# Patient Record
Sex: MALE | Race: WHITE | Employment: OTHER | URBAN - METROPOLITAN AREA
[De-identification: names, ages, dates, MRNs, and addresses within clinical notes are randomized per-mention and may not be internally consistent; named-entity substitution may affect disease eponyms.]

---

## 2021-02-15 ENCOUNTER — HOSPITAL ENCOUNTER (OUTPATIENT)
Age: 71
Discharge: HOME | End: 2021-02-15
Payer: MEDICARE

## 2021-02-15 LAB
ALBUMIN SERPL-MCNC: 4.9 G/DL (ref 3.5–5.2)
ALP BLD-CCNC: 82 U/L (ref 40–129)
ALT SERPL-CCNC: 13 U/L (ref 0–40)
ANION GAP SERPL CALCULATED.3IONS-SCNC: 8 MMOL/L (ref 7–16)
AST SERPL-CCNC: 19 U/L (ref 0–39)
BASOPHILS ABSOLUTE: 0.02 E9/L (ref 0–0.2)
BASOPHILS RELATIVE PERCENT: 0.3 % (ref 0–2)
BILIRUB SERPL-MCNC: 0.6 MG/DL (ref 0–1.2)
BUN BLDV-MCNC: 21 MG/DL (ref 8–23)
CALCIUM SERPL-MCNC: 9.8 MG/DL (ref 8.6–10.2)
CEA: 2.5 NG/ML (ref 0–5.2)
CHLORIDE BLD-SCNC: 101 MMOL/L (ref 98–107)
CHOLESTEROL, TOTAL: 168 MG/DL (ref 0–199)
CO2: 30 MMOL/L (ref 22–29)
CREAT SERPL-MCNC: 0.9 MG/DL (ref 0.7–1.2)
EOSINOPHILS ABSOLUTE: 0.19 E9/L (ref 0.05–0.5)
EOSINOPHILS RELATIVE PERCENT: 3.2 % (ref 0–6)
GFR AFRICAN AMERICAN: >60
GFR NON-AFRICAN AMERICAN: >60 ML/MIN/1.73
GLUCOSE BLD-MCNC: 93 MG/DL (ref 74–99)
HCT VFR BLD CALC: 45.7 % (ref 37–54)
HDLC SERPL-MCNC: 53 MG/DL
HEMOGLOBIN: 15 G/DL (ref 12.5–16.5)
IMMATURE GRANULOCYTES #: 0.01 E9/L
IMMATURE GRANULOCYTES %: 0.2 % (ref 0–5)
LDL CHOLESTEROL CALCULATED: 89 MG/DL (ref 0–99)
LYMPHOCYTES ABSOLUTE: 1.64 E9/L (ref 1.5–4)
LYMPHOCYTES RELATIVE PERCENT: 28 % (ref 20–42)
MCH RBC QN AUTO: 29 PG (ref 26–35)
MCHC RBC AUTO-ENTMCNC: 32.8 % (ref 32–34.5)
MCV RBC AUTO: 88.4 FL (ref 80–99.9)
MONOCYTES ABSOLUTE: 0.45 E9/L (ref 0.1–0.95)
MONOCYTES RELATIVE PERCENT: 7.7 % (ref 2–12)
NEUTROPHILS ABSOLUTE: 3.54 E9/L (ref 1.8–7.3)
NEUTROPHILS RELATIVE PERCENT: 60.6 % (ref 43–80)
PDW BLD-RTO: 12.7 FL (ref 11.5–15)
PLATELET # BLD: 278 E9/L (ref 130–450)
PMV BLD AUTO: 9.9 FL (ref 7–12)
POTASSIUM SERPL-SCNC: 4.8 MMOL/L (ref 3.5–5)
PROSTATE SPECIFIC ANTIGEN: 4.66 NG/ML (ref 0–4)
RBC # BLD: 5.17 E12/L (ref 3.8–5.8)
SODIUM BLD-SCNC: 139 MMOL/L (ref 132–146)
T4 TOTAL: 5.9 MCG/DL (ref 4.5–11.7)
TOTAL PROTEIN: 7.7 G/DL (ref 6.4–8.3)
TRIGL SERPL-MCNC: 130 MG/DL (ref 0–149)
TSH SERPL DL<=0.05 MIU/L-ACNC: 1.34 UIU/ML (ref 0.27–4.2)
VLDLC SERPL CALC-MCNC: 26 MG/DL
WBC # BLD: 5.9 E9/L (ref 4.5–11.5)

## 2021-02-15 PROCEDURE — 80061 LIPID PANEL: CPT

## 2021-02-15 PROCEDURE — 82378 CARCINOEMBRYONIC ANTIGEN: CPT

## 2021-02-15 PROCEDURE — 36415 COLL VENOUS BLD VENIPUNCTURE: CPT

## 2021-02-15 PROCEDURE — 85025 COMPLETE CBC W/AUTO DIFF WBC: CPT

## 2021-02-15 PROCEDURE — 84436 ASSAY OF TOTAL THYROXINE: CPT

## 2021-02-15 PROCEDURE — 84443 ASSAY THYROID STIM HORMONE: CPT

## 2021-02-15 PROCEDURE — G0103 PSA SCREENING: HCPCS

## 2021-02-15 PROCEDURE — 80053 COMPREHEN METABOLIC PANEL: CPT

## 2021-04-28 ENCOUNTER — HOSPITAL ENCOUNTER (OUTPATIENT)
Age: 71
Discharge: HOME | End: 2021-04-28
Payer: MEDICARE

## 2021-04-28 LAB — PROSTATE SPECIFIC ANTIGEN: 3.83 NG/ML (ref 0–4)

## 2021-04-28 PROCEDURE — 36415 COLL VENOUS BLD VENIPUNCTURE: CPT

## 2021-04-28 PROCEDURE — G0103 PSA SCREENING: HCPCS

## 2021-06-23 ENCOUNTER — HOSPITAL ENCOUNTER (OUTPATIENT)
Dept: GENERAL RADIOLOGY | Age: 71
Discharge: HOME | End: 2021-06-25
Payer: MEDICARE

## 2021-06-23 ENCOUNTER — HOSPITAL ENCOUNTER (OUTPATIENT)
Age: 71
Discharge: HOME | End: 2021-06-25
Payer: MEDICARE

## 2021-06-23 DIAGNOSIS — M19.011 ARTHRITIS OF RIGHT SHOULDER REGION: ICD-10-CM

## 2021-06-23 PROCEDURE — 73030 X-RAY EXAM OF SHOULDER: CPT

## 2022-01-13 ENCOUNTER — OFFICE VISIT (OUTPATIENT)
Dept: SURGERY | Age: 72
End: 2022-01-13
Payer: COMMERCIAL

## 2022-01-13 ENCOUNTER — TELEPHONE (OUTPATIENT)
Dept: SURGERY | Age: 72
End: 2022-01-13

## 2022-01-13 VITALS
HEART RATE: 80 BPM | DIASTOLIC BLOOD PRESSURE: 62 MMHG | TEMPERATURE: 97 F | HEIGHT: 68 IN | WEIGHT: 166.2 LBS | SYSTOLIC BLOOD PRESSURE: 104 MMHG | BODY MASS INDEX: 25.19 KG/M2 | OXYGEN SATURATION: 96 %

## 2022-01-13 DIAGNOSIS — C44.629 SQUAMOUS CELL CARCINOMA OF LEFT UPPER EXTREMITY: Primary | ICD-10-CM

## 2022-01-13 PROCEDURE — 1123F ACP DISCUSS/DSCN MKR DOCD: CPT | Performed by: PLASTIC SURGERY

## 2022-01-13 PROCEDURE — G8419 CALC BMI OUT NRM PARAM NOF/U: HCPCS | Performed by: PLASTIC SURGERY

## 2022-01-13 PROCEDURE — 99203 OFFICE O/P NEW LOW 30 MIN: CPT | Performed by: PLASTIC SURGERY

## 2022-01-13 PROCEDURE — 3017F COLORECTAL CA SCREEN DOC REV: CPT | Performed by: PLASTIC SURGERY

## 2022-01-13 PROCEDURE — G8427 DOCREV CUR MEDS BY ELIG CLIN: HCPCS | Performed by: PLASTIC SURGERY

## 2022-01-13 PROCEDURE — 4040F PNEUMOC VAC/ADMIN/RCVD: CPT | Performed by: PLASTIC SURGERY

## 2022-01-13 PROCEDURE — 1036F TOBACCO NON-USER: CPT | Performed by: PLASTIC SURGERY

## 2022-01-13 PROCEDURE — G8484 FLU IMMUNIZE NO ADMIN: HCPCS | Performed by: PLASTIC SURGERY

## 2022-01-13 RX ORDER — M-VIT,TX,IRON,MINS/CALC/FOLIC 27MG-0.4MG
1 TABLET ORAL DAILY
COMMUNITY

## 2022-01-13 RX ORDER — CITALOPRAM 20 MG/1
20 TABLET ORAL DAILY
COMMUNITY

## 2022-01-13 RX ORDER — ROSUVASTATIN CALCIUM 10 MG/1
10 TABLET, COATED ORAL DAILY
COMMUNITY

## 2022-01-13 RX ORDER — ESOMEPRAZOLE MAGNESIUM 20 MG/1
20 FOR SUSPENSION ORAL DAILY
COMMUNITY

## 2022-01-13 NOTE — PROGRESS NOTES
Department of Plastic Surgery - Adult  Attending Consult Note      CHIEF COMPLAINT:   Squamous Cell Cancer of left forearm    History Obtained From:  patient    HISTORY OF PRESENT ILLNESS:                The patient is a 70 y.o. 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 The patient states that they first noticed the lesion in November. It has grown in size since they first noticed the lesion. The lesion has changed in color and has not had discharge or bleeding. The pt has had the lesion biopsied and excised previously. The patient states that his doctor is concerned for residual squamous cell carcinoma at the edges of the excision site. The patient states the lesion is not painfull. The pt denies any associated symptoms. Past Medical History:    Past Medical History:   Diagnosis Date    Cancer ([facility] [address]     Cataract     Heartburn     Rheumatoid arthritis ([facility]     Skin cancer     Substance abuse ([facility] [facility]     Tuberculosis      Past Surgical History:    Past Surgical History:   Procedure Laterality Date    TONSILLECTOMY  1965     Current Medications:       Current Outpatient Medications   Medication Sig Dispense Refill    citalopram (CELEXA) 20 MG tablet Take 20 mg by mouth daily      rosuvastatin (CRESTOR) 10 MG tablet Take 10 mg by mouth daily      Multiple Vitamins-Minerals (THERAPEUTIC MULTIVITAMIN-MINERALS) tablet Take 1 tablet by mouth daily      esomeprazole Magnesium (NEXIUM) 20 MG PACK Take 20 mg by mouth daily       No current facility-administered medications for this visit. Allergies:  Patient has no known allergies.     Social History:   Social History     Socioeconomic History    Marital status:      Spouse name: Not on file    Number of children: Not on file    Years of education: Not on file    Highest education level: Not on file   Occupational History    Not on file   Tobacco Use    Smoking status: Never Smoker    Smokeless tobacco: Never Used   Substance and Sexual Activity    Alcohol use: Not Currently    Drug use: Not Currently    Sexual activity: Not on file   Other Topics Concern    Not on file   Social History Narrative    Not on file     Social Determinants of Health     Financial Resource Strain:     Difficulty of Paying Living Expenses: Not on file   Food Insecurity:     Worried About Running Out of Food in the Last Year: Not on file    [place] of Food in the Last Year: Not on file   Transportation Needs:     Lack of Transportation (Medical): Not on file    Lack of Transportation (Non-Medical): Not on file   Physical Activity:     Days of Exercise per Week: Not on file    Minutes of Exercise per Session: Not on file   Stress:     Feeling of Stress : Not on file   Social Connections:     Frequency of Communication with Friends and Family: Not on file    Frequency of Social Gatherings with Friends and Family: Not on file    Attends Adventism Services: Not on file    Active Member of Clubs or Organizations: Not on file    Attends Club or Organization Meetings: Not on file    Marital Status: Not on file   Intimate Partner Violence:     Fear of Current or Ex-Partner: Not on file    Emotionally Abused: Not on file    Physically Abused: Not on file    Sexually Abused: Not on file   Housing Stability:     Unable to Pay for Housing in the Last Year: Not on file    Number of [place] in the Last Year: Not on file    Unstable Housing in the Last Year: Not on file     Family History:   Family History   Problem Relation Age of Onset    Rheum Arthritis Mother     Cancer Mother     Depression Mother     Migraines Mother     Stroke Father        REVIEW OF SYSTEMS:    CONSTITUTIONAL:  negative for  fevers, chills, sweats and fatigue  EYES: negative for dipolpia or acute vision loss.    RESPIRATORY:  negative for  dry cough, cough with sputum, dyspnea, wheezing and chest pain  HENT:negative for pain, headache, difficulty swallowing or nose bleeds. CARDIOVASCULAR:  negative for  chest pain, dyspnea, palpitations, syncope  GASTROINTESTINAL:  negative for nausea, vomiting, change in bowel habits, diarrhea, constipation and abdominal pain  EXTREMITIES: negative for edema  MUSCULOSKELETAL: negative for muscle weakness  SKIN: positive for lesion, negative for itching or rashes. HEME: negative for easy brusing or bleeding  PSYCH: Alert and oriented to person place and time      PHYSICAL EXAM:      VITALS:  /62 (Site: Right Upper Arm, Position: Sitting, Cuff Size: Medium Adult)   Pulse 80   Temp 97 °F (36.1 °C) (Temporal)   Ht 5' 7.5\" (1.715 m)   Wt 166 lb 3.2 oz (75.4 kg)   SpO2 96%   BMI 25.65 kg/m²   CONSTITUTIONAL:  awake, alert, cooperative, no apparent distress, and appears stated age  EYES: PERRLA, EOMI, no signs of occular infection  LUNGS:  No increased work of breathing, good air exchange, clear to auscultation bilaterally, no crackles or wheezing  CARDIOVASCULAR:  Normal apical impulse, regular rate and rhythm  EXTREMITIES: no signs of clubbing or cyanosis. MUSCULOSKELETAL: negative for flaccid muscle tone or spastic movements. NEURO: Cranial nerves II-XII grossly intact. No signs of agitated mood. SKIN: Biopsy proven squamous cell carcinoma left forearm-  25mm x 20mm,  Skin tone in color, irregular border, raised, no signs of bleeding,drainage or infection. Non tender to palpation.      DATA:    Labs: CBC:   Lab Results   Component Value Date    WBC 5.9 02/15/2021    RBC 5.17 02/15/2021    HGB 15.0 02/15/2021    HCT 45.7 02/15/2021    MCV 88.4 02/15/2021    MCH 29.0 02/15/2021    MCHC 32.8 02/15/2021    RDW 12.7 02/15/2021     02/15/2021    MPV 9.9 02/15/2021     BMP:    Lab Results   Component Value Date     02/15/2021    K 4.8 02/15/2021     02/15/2021    CO2 30 02/15/2021    BUN 21 02/15/2021    LABALBU 4.9 02/15/2021    CREATININE 0.9 02/15/2021    CALCIUM 9.8 02/15/2021 GFRAA >60 02/15/2021    LABGLOM >60 02/15/2021    GLUCOSE 93 02/15/2021       Radiology Review:  No radiology needed at this time  Pathology Review:  Pathology reviewed           IMPRESSION/RECOMMENDATIONS:        Diagnosis  -) Squamous cell carcinoma of left forearm      -The patient was counseled on their pathology results and the need for surgical   intervention.   -We will plan to proceed and have the lesion formely excised with margins in the operating room. -The patient will  require frozen sections in the operating room  -The patient will  require pre-op clearance from their PCP. -The risks, benefits and options were discussed with the pt. The risks included but not limited to pain, bleeding, infection, heavy scarring, damage to surrounding structures, fluid collections, asymmetry, and need for further procedures. All of His questions were answered to their satisfaction and He agrees to proceed with the procedure. Photos obtained        This document is generated, in part, by voice recognition software and thus  syntax and grammatical errors are possible.     Carlos Cooley MD  2:07 PM  1/19/2022

## 2022-01-18 NOTE — PROGRESS NOTES
[address] [facility] PRE-ADMISSION TESTING GENERAL INSTRUCTIONS  [facility] Phone Number: [phone]    The Preadmission Testing patient is instructed accordingly using the following criteria (Check Applicable):    GENERAL INSTRUCTIONS:    [x] Antibacterial Soap Shower Morning of Surgery. [x] Do not wear lotions, powders, deodorant. [x] Nothing by mouth after midnight, including gum, candy, mints, or water. [x] You may brush your teeth, gargle, but do NOT swallow water. [x] No tobacco products, illegal drugs, or alcohol within 24 hours of your surgery. [x] Jewelry, valuables or body piercing's should not be brought to the hospital. All body and/or tongue piercing's must be removed prior to arriving to hospital. ALL hair pins must be removed. [x] Arrange transportation with a responsible adult  to and from the hospital. Arrange for someone to be with you for the remainder of the day and for 24 hours after your procedure due to having had anesthesia. Who will be your  for transportation? Cher Lauren- Wife        Who will be staying with you for 24 hrs after your procedure? Cher Lauren- Wife  [x] Bring insurance card and photo ID. [x] Bring copy of living will or healthcare power of  papers to be placed in your electronic record. PARKING INSTRUCTIONS:     [x] Arrival Time: 1/25/22 @ 0530. · [x] Parking lot '\"I\"  is located on [facility] (the corner of [facility]). To enter, press the button and the gate will lift. A free token will be provided to exit the lot. EDUCATION INSTRUCTIONS:        [] Pain: Post-op pain is normal and to be expected. You will be asked to rate your pain from 0-10. [] Ask your nurse for your pain medication. MEDICATION INSTRUCTIONS:    [x] Bring a complete list of your medications, please write the last time you took the medicine, give this list to the nurse.   [x] Take the following medications the morning of surgery with 1-2 ounces of water: Celexa  [x] Stop herbal supplements and vitamins 5 days before your surgery. [x] Follow physician instructions regarding any blood thinners you may be taking. WHAT TO EXPECT:    [x] The day of surgery you will be greeted and checked in by the [employer].  In addition, you will be registered in the [facility] by a Patient Access Representative. Please bring your photo ID and insurance card. A nurse will greet you in accordance to the time you are needed in the pre-op area to prepare you for surgery. Please do not be discouraged if you are not greeted in the order you arrive as there are many variables that are involved in patient preparation. Your patience is greatly appreciated as you wait for your nurse. Please bring in items such as: books, magazines, newspapers, electronics, or any other items  to occupy your time in the waiting area. [x]  Delays may occur with surgery and staff will make a sincere effort to keep you informed of delays. If any delays occur with your procedure, we apologize ahead of time for your inconvenience as we recognize the value of your time.

## 2022-01-19 ENCOUNTER — PREP FOR PROCEDURE (OUTPATIENT)
Dept: SURGERY | Age: 72
End: 2022-01-19

## 2022-01-19 RX ORDER — SODIUM CHLORIDE 0.9 % (FLUSH) 0.9 %
5-40 SYRINGE (ML) INJECTION EVERY 12 HOURS SCHEDULED
Status: CANCELLED | OUTPATIENT
Start: 2022-01-19

## 2022-01-19 RX ORDER — SODIUM CHLORIDE 0.9 % (FLUSH) 0.9 %
5-40 SYRINGE (ML) INJECTION PRN
Status: CANCELLED | OUTPATIENT
Start: 2022-01-19

## 2022-01-19 RX ORDER — SODIUM CHLORIDE 9 MG/ML
INJECTION, SOLUTION INTRAVENOUS CONTINUOUS
Status: CANCELLED | OUTPATIENT
Start: 2022-01-19

## 2022-01-19 RX ORDER — SODIUM CHLORIDE 9 MG/ML
25 INJECTION, SOLUTION INTRAVENOUS PRN
Status: CANCELLED | OUTPATIENT
Start: 2022-01-19

## 2022-01-19 NOTE — H&P
Department of Plastic Surgery - Adult  Attending Consult Note        CHIEF COMPLAINT:   Squamous Cell Cancer of left forearm     History Obtained From:  patient     HISTORY OF PRESENT ILLNESS:                 The patient is a 70 y.o. 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 The patient states that they first noticed the lesion in November. It has grown in size since they first noticed the lesion. The lesion has changed in color and has not had discharge or bleeding. The pt has had the lesion biopsied and excised previously. The patient states that his doctor is concerned for residual squamous cell carcinoma at the edges of the excision site. The patient states the lesion is not painfull.   The pt denies any associated symptoms.       Past Medical History:    Past Medical History        Past Medical History:   Diagnosis Date    Cancer ([facility] [address]      Cataract      Heartburn      Rheumatoid arthritis ([facility] [address] [facility]      Skin cancer      Substance abuse ([facility] [address]      Tuberculosis           Past Surgical History:    Past Surgical History         Past Surgical History:   Procedure Laterality Date    TONSILLECTOMY   1965         Current Medications:       Current Facility-Administered Medications          Current Outpatient Medications   Medication Sig Dispense Refill    citalopram (CELEXA) 20 MG tablet Take 20 mg by mouth daily        rosuvastatin (CRESTOR) 10 MG tablet Take 10 mg by mouth daily        Multiple Vitamins-Minerals (THERAPEUTIC MULTIVITAMIN-MINERALS) tablet Take 1 tablet by mouth daily        esomeprazole Magnesium (NEXIUM) 20 MG PACK Take 20 mg by mouth daily          No current facility-administered medications for this visit.          Allergies:  Patient has no known allergies.     Social History:   Social History               Socioeconomic History    Marital status:        Spouse name: Not on file    Number of children: Not on file    Years of education: Not on file    Highest education level: Not on file   Occupational History    Not on file   Tobacco Use    Smoking status: Never Smoker    Smokeless tobacco: Never Used   Substance and Sexual Activity    Alcohol use: Not Currently    Drug use: Not Currently    Sexual activity: Not on file   Other Topics Concern    Not on file   Social History Narrative    Not on file      Social Determinants of Health          Financial Resource Strain:     Difficulty of Paying Living Expenses: Not on file   Food Insecurity:     Worried About Running Out of Food in the Last Year: Not on file    [place] of Food in the Last Year: Not on file   Transportation Needs:     Lack of Transportation (Medical): Not on file    Lack of Transportation (Non-Medical):  Not on file   Physical Activity:     Days of Exercise per Week: Not on file    Minutes of Exercise per Session: Not on file   Stress:     Feeling of Stress : Not on file   Social Connections:     Frequency of Communication with Friends and Family: Not on file    Frequency of Social Gatherings with Friends and Family: Not on file    Attends Buddhist Services: Not on file    Active Member of [address] [place] [address] or Organizations: Not on file    Attends Club or Organization Meetings: Not on file    Marital Status: Not on file   Intimate Partner Violence:     Fear of Current or Ex-Partner: Not on file    Emotionally Abused: Not on file    Physically Abused: Not on file    Sexually Abused: Not on file   Housing Stability:     Unable to Pay for Housing in the Last Year: Not on file    Number of [place] in the Last Year: Not on file    Unstable Housing in the Last Year: Not on file         Family History:   Family History         Family History   Problem Relation Age of Onset    Rheum Arthritis Mother      Cancer Mother      Depression Mother      Migraines Mother      Stroke Father              REVIEW OF SYSTEMS:    CONSTITUTIONAL:  negative for  fevers, chills, sweats and fatigue  EYES: negative for dipolpia or acute vision loss. RESPIRATORY:  negative for  dry cough, cough with sputum, dyspnea, wheezing and chest pain  HENT:negative for pain, headache, difficulty swallowing or nose bleeds. CARDIOVASCULAR:  negative for  chest pain, dyspnea, palpitations, syncope  GASTROINTESTINAL:  negative for nausea, vomiting, change in bowel habits, diarrhea, constipation and abdominal pain  EXTREMITIES: negative for edema  MUSCULOSKELETAL: negative for muscle weakness  SKIN: positive for lesion, negative for itching or rashes. HEME: negative for easy brusing or bleeding  PSYCH: Alert and oriented to person place and time        PHYSICAL EXAM:       VITALS:  /62 (Site: Right Upper Arm, Position: Sitting, Cuff Size: Medium Adult)   Pulse 80   Temp 97 °F (36.1 °C) (Temporal)   Ht 5' 7.5\" (1.715 m)   Wt 166 lb 3.2 oz (75.4 kg)   SpO2 96%   BMI 25.65 kg/m²   CONSTITUTIONAL:  awake, alert, cooperative, no apparent distress, and appears stated age  EYES: PERRLA, EOMI, no signs of occular infection  LUNGS:  No increased work of breathing, good air exchange, clear to auscultation bilaterally, no crackles or wheezing  CARDIOVASCULAR:  Normal apical impulse, regular rate and rhythm  EXTREMITIES: no signs of clubbing or cyanosis. MUSCULOSKELETAL: negative for flaccid muscle tone or spastic movements. NEURO: Cranial nerves II-XII grossly intact. No signs of agitated mood.      SKIN: Biopsy proven squamous cell carcinoma left forearm-  25mm x 20mm,  Skin tone in color, irregular border, raised, no signs of bleeding,drainage or infection.  Non tender to palpation.      DATA:    Labs: CBC:         Lab Results   Component Value Date     WBC 5.9 02/15/2021     RBC 5.17 02/15/2021     HGB 15.0 02/15/2021     HCT 45.7 02/15/2021     MCV 88.4 02/15/2021     MCH 29.0 02/15/2021     MCHC 32.8 02/15/2021     RDW 12.7 02/15/2021      02/15/2021     MPV 9.9 02/15/2021      BMP: Lab Results   Component Value Date      02/15/2021     K 4.8 02/15/2021      02/15/2021     CO2 30 02/15/2021     BUN 21 02/15/2021     LABALBU 4.9 02/15/2021     CREATININE 0.9 02/15/2021     CALCIUM 9.8 02/15/2021     GFRAA >60 02/15/2021     LABGLOM >60 02/15/2021     GLUCOSE 93 02/15/2021         Radiology Review:  No radiology needed at this time  Pathology Review:  Pathology reviewed             IMPRESSION/RECOMMENDATIONS:          Diagnosis  -) Squamous cell carcinoma of left forearm        -The patient was counseled on their pathology results and the need for surgical   intervention.   -We will plan to proceed and have the lesion formely excised with margins in the operating room. -The patient will  require frozen sections in the operating room  -The patient will  require pre-op clearance from their PCP.          -The risks, benefits and options were discussed with the pt. The risks included but not limited to pain, bleeding, infection, heavy scarring, damage to surrounding structures, fluid collections, asymmetry, and need for further procedures. All of His questions were answered to their satisfaction and He agrees to proceed with the procedure.

## 2022-01-25 ENCOUNTER — HOSPITAL ENCOUNTER (OUTPATIENT)
Age: 72
Setting detail: OUTPATIENT SURGERY
Discharge: HOME | End: 2022-01-25
Attending: PLASTIC SURGERY | Admitting: PLASTIC SURGERY
Payer: COMMERCIAL

## 2022-01-25 ENCOUNTER — ANESTHESIA EVENT (OUTPATIENT)
Dept: OPERATING ROOM | Age: 72
End: 2022-01-25
Payer: COMMERCIAL

## 2022-01-25 ENCOUNTER — ANESTHESIA (OUTPATIENT)
Dept: OPERATING ROOM | Age: 72
End: 2022-01-25
Payer: COMMERCIAL

## 2022-01-25 VITALS
RESPIRATION RATE: 18 BRPM | TEMPERATURE: 97.7 F | OXYGEN SATURATION: 98 % | HEIGHT: 67 IN | HEART RATE: 66 BPM | WEIGHT: 160 LBS | DIASTOLIC BLOOD PRESSURE: 67 MMHG | BODY MASS INDEX: 25.11 KG/M2 | SYSTOLIC BLOOD PRESSURE: 125 MMHG

## 2022-01-25 VITALS — OXYGEN SATURATION: 99 % | SYSTOLIC BLOOD PRESSURE: 124 MMHG | DIASTOLIC BLOOD PRESSURE: 68 MMHG

## 2022-01-25 DIAGNOSIS — G89.18 POST-OP PAIN: Primary | ICD-10-CM

## 2022-01-25 PROCEDURE — 3700000001 HC ADD 15 MINUTES (ANESTHESIA): Performed by: PLASTIC SURGERY

## 2022-01-25 PROCEDURE — 3600000002 HC SURGERY LEVEL 2 BASE: Performed by: PLASTIC SURGERY

## 2022-01-25 PROCEDURE — 2500000003 HC RX 250 WO HCPCS: Performed by: PLASTIC SURGERY

## 2022-01-25 PROCEDURE — 2709999900 HC NON-CHARGEABLE SUPPLY: Performed by: PLASTIC SURGERY

## 2022-01-25 PROCEDURE — 6360000002 HC RX W HCPCS: Performed by: ANESTHESIOLOGIST ASSISTANT

## 2022-01-25 PROCEDURE — 99999 PR OFFICE/OUTPT VISIT,PROCEDURE ONLY: CPT | Performed by: PHYSICIAN ASSISTANT

## 2022-01-25 PROCEDURE — 3700000000 HC ANESTHESIA ATTENDED CARE: Performed by: PLASTIC SURGERY

## 2022-01-25 PROCEDURE — 6370000000 HC RX 637 (ALT 250 FOR IP): Performed by: PLASTIC SURGERY

## 2022-01-25 PROCEDURE — 88305 TISSUE EXAM BY PATHOLOGIST: CPT

## 2022-01-25 PROCEDURE — 3600000012 HC SURGERY LEVEL 2 ADDTL 15MIN: Performed by: PLASTIC SURGERY

## 2022-01-25 PROCEDURE — 15240 FTH/GFT F/C/C/M/N/AX/G/H/F20: CPT | Performed by: PLASTIC SURGERY

## 2022-01-25 PROCEDURE — 2580000003 HC RX 258: Performed by: PHYSICIAN ASSISTANT

## 2022-01-25 PROCEDURE — 7100000011 HC PHASE II RECOVERY - ADDTL 15 MIN: Performed by: PLASTIC SURGERY

## 2022-01-25 PROCEDURE — 6360000002 HC RX W HCPCS: Performed by: PHYSICIAN ASSISTANT

## 2022-01-25 PROCEDURE — 7100000010 HC PHASE II RECOVERY - FIRST 15 MIN: Performed by: PLASTIC SURGERY

## 2022-01-25 PROCEDURE — 11604 EXC TR-EXT MAL+MARG 3.1-4 CM: CPT | Performed by: PLASTIC SURGERY

## 2022-01-25 RX ORDER — FENTANYL CITRATE 50 UG/ML
25 INJECTION, SOLUTION INTRAMUSCULAR; INTRAVENOUS EVERY 5 MIN PRN
Status: DISCONTINUED | OUTPATIENT
Start: 2022-01-25 | End: 2022-01-25 | Stop reason: HOSPADM

## 2022-01-25 RX ORDER — SODIUM CHLORIDE 9 MG/ML
INJECTION, SOLUTION INTRAVENOUS CONTINUOUS
Status: DISCONTINUED | OUTPATIENT
Start: 2022-01-25 | End: 2022-01-25 | Stop reason: HOSPADM

## 2022-01-25 RX ORDER — PROPOFOL 10 MG/ML
INJECTION, EMULSION INTRAVENOUS PRN
Status: DISCONTINUED | OUTPATIENT
Start: 2022-01-25 | End: 2022-01-25 | Stop reason: SDUPTHER

## 2022-01-25 RX ORDER — MIDAZOLAM HYDROCHLORIDE 1 MG/ML
INJECTION INTRAMUSCULAR; INTRAVENOUS PRN
Status: DISCONTINUED | OUTPATIENT
Start: 2022-01-25 | End: 2022-01-25 | Stop reason: SDUPTHER

## 2022-01-25 RX ORDER — LIDOCAINE HYDROCHLORIDE AND EPINEPHRINE 10; 10 MG/ML; UG/ML
INJECTION, SOLUTION INFILTRATION; PERINEURAL PRN
Status: DISCONTINUED | OUTPATIENT
Start: 2022-01-25 | End: 2022-01-25 | Stop reason: ALTCHOICE

## 2022-01-25 RX ORDER — ONDANSETRON 2 MG/ML
4 INJECTION INTRAMUSCULAR; INTRAVENOUS
Status: DISCONTINUED | OUTPATIENT
Start: 2022-01-25 | End: 2022-01-25 | Stop reason: HOSPADM

## 2022-01-25 RX ORDER — DIAPER,BRIEF,INFANT-TODD,DISP
EACH MISCELLANEOUS PRN
Status: DISCONTINUED | OUTPATIENT
Start: 2022-01-25 | End: 2022-01-25 | Stop reason: ALTCHOICE

## 2022-01-25 RX ORDER — HYDROCODONE BITARTRATE AND ACETAMINOPHEN 5; 325 MG/1; MG/1
1 TABLET ORAL EVERY 6 HOURS PRN
Qty: 12 TABLET | Refills: 0 | Status: SHIPPED | OUTPATIENT
Start: 2022-01-25 | End: 2022-01-28

## 2022-01-25 RX ORDER — SODIUM CHLORIDE 9 MG/ML
25 INJECTION, SOLUTION INTRAVENOUS PRN
Status: DISCONTINUED | OUTPATIENT
Start: 2022-01-25 | End: 2022-01-25 | Stop reason: HOSPADM

## 2022-01-25 RX ORDER — CEPHALEXIN 500 MG/1
500 CAPSULE ORAL 3 TIMES DAILY
Qty: 21 CAPSULE | Refills: 0 | Status: SHIPPED | OUTPATIENT
Start: 2022-01-25 | End: 2022-02-01

## 2022-01-25 RX ORDER — DIAPER,BRIEF,INFANT-TODD,DISP
EACH MISCELLANEOUS
Qty: 30 G | Refills: 1 | Status: SHIPPED | OUTPATIENT
Start: 2022-01-25

## 2022-01-25 RX ORDER — HYDROCODONE BITARTRATE AND ACETAMINOPHEN 5; 325 MG/1; MG/1
1 TABLET ORAL PRN
Status: DISCONTINUED | OUTPATIENT
Start: 2022-01-25 | End: 2022-01-25 | Stop reason: HOSPADM

## 2022-01-25 RX ORDER — HYDROCODONE BITARTRATE AND ACETAMINOPHEN 5; 325 MG/1; MG/1
2 TABLET ORAL PRN
Status: DISCONTINUED | OUTPATIENT
Start: 2022-01-25 | End: 2022-01-25 | Stop reason: HOSPADM

## 2022-01-25 RX ORDER — SODIUM CHLORIDE 0.9 % (FLUSH) 0.9 %
5-40 SYRINGE (ML) INJECTION PRN
Status: DISCONTINUED | OUTPATIENT
Start: 2022-01-25 | End: 2022-01-25 | Stop reason: HOSPADM

## 2022-01-25 RX ORDER — SODIUM CHLORIDE 0.9 % (FLUSH) 0.9 %
5-40 SYRINGE (ML) INJECTION EVERY 12 HOURS SCHEDULED
Status: DISCONTINUED | OUTPATIENT
Start: 2022-01-25 | End: 2022-01-25 | Stop reason: HOSPADM

## 2022-01-25 RX ORDER — FENTANYL CITRATE 50 UG/ML
INJECTION, SOLUTION INTRAMUSCULAR; INTRAVENOUS PRN
Status: DISCONTINUED | OUTPATIENT
Start: 2022-01-25 | End: 2022-01-25 | Stop reason: SDUPTHER

## 2022-01-25 RX ADMIN — PROPOFOL 50 MCG/KG/MIN: 10 INJECTION, EMULSION INTRAVENOUS at 07:30

## 2022-01-25 RX ADMIN — FENTANYL CITRATE 25 MCG: 50 INJECTION, SOLUTION INTRAMUSCULAR; INTRAVENOUS at 07:43

## 2022-01-25 RX ADMIN — PROPOFOL 30 MG: 10 INJECTION, EMULSION INTRAVENOUS at 07:29

## 2022-01-25 RX ADMIN — SODIUM CHLORIDE: 9 INJECTION, SOLUTION INTRAVENOUS at 06:17

## 2022-01-25 RX ADMIN — FENTANYL CITRATE 25 MCG: 50 INJECTION, SOLUTION INTRAMUSCULAR; INTRAVENOUS at 07:29

## 2022-01-25 RX ADMIN — MIDAZOLAM 1 MG: 1 INJECTION INTRAMUSCULAR; INTRAVENOUS at 07:25

## 2022-01-25 RX ADMIN — Medication 2000 MG: at 07:30

## 2022-01-25 ASSESSMENT — PULMONARY FUNCTION TESTS
PIF_VALUE: 21
PIF_VALUE: 23
PIF_VALUE: 21
PIF_VALUE: 23
PIF_VALUE: 0
PIF_VALUE: 133
PIF_VALUE: 21
PIF_VALUE: 23
PIF_VALUE: 21
PIF_VALUE: 4
PIF_VALUE: 21
PIF_VALUE: 21
PIF_VALUE: 23
PIF_VALUE: 23
PIF_VALUE: 21
PIF_VALUE: 21
PIF_VALUE: 23
PIF_VALUE: 23
PIF_VALUE: 21
PIF_VALUE: 21
PIF_VALUE: 0
PIF_VALUE: 0
PIF_VALUE: 25
PIF_VALUE: 0
PIF_VALUE: 21
PIF_VALUE: 23
PIF_VALUE: 21
PIF_VALUE: 23
PIF_VALUE: 21
PIF_VALUE: 23
PIF_VALUE: 21
PIF_VALUE: 21
PIF_VALUE: 0
PIF_VALUE: 23
PIF_VALUE: 21
PIF_VALUE: 1
PIF_VALUE: 1
PIF_VALUE: 23

## 2022-01-25 ASSESSMENT — PAIN SCALES - GENERAL
PAINLEVEL_OUTOF10: 0

## 2022-01-25 ASSESSMENT — PAIN - FUNCTIONAL ASSESSMENT: PAIN_FUNCTIONAL_ASSESSMENT: 0-10

## 2022-01-25 NOTE — BRIEF OP NOTE
Brief Postoperative Note      Patient: Nia White  YOB: 1950  MRN: [id number]    Date of Procedure: 1/25/2022    Pre-Op Diagnosis: SQUAMOUS CELL CARCINOMA LEFT FOREARM    Post-Op Diagnosis: Same       Procedure(s):  EXCISION SQUAMOUS CELL CARCINOMA LEFT FOREARM FULL THICKNESS SKIN GRAFT FROM LEFT CHEST    Surgeon(s):  Portillo Rucker MD    Assistant:  Physician Assistant: JULIAN Posada  Resident: Jordy Vargas DO    Anesthesia: Monitor Anesthesia Care    Estimated Blood Loss (mL): 5cc    Fluids: 174UX    Complications: None    Specimens:   ID Type Source Tests Collected by Time Destination   A : LEFT FOREARM SQUAMOUS CELL CARCINOMA Tissue Tissue SURGICAL PATHOLOGY Portillo Rucker MD 1/25/2022 7660        Implants:  * No implants in log *      Drains: * No LDAs found *    Findings: Lesion: [id number], Margin 4mm, Defect [id number], FTSG [id number]    Electronically signed by Jordy Vargas DO on 1/25/2022 at 9:35 AM

## 2022-01-25 NOTE — ANESTHESIA PRE PROCEDURE
Department of Anesthesiology  Preprocedure Note       Name:  Teodoro Garcia   Age:  70 y.o.  :  1950                                          MRN:  [id number]         Date:  2022      Surgeon: Marcy Brian):  Adrián Sanchez MD    Procedure: Procedure(s):  EXCISION SQUAMOUS CELL CARCINOMA LEFT FOREARM    Medications prior to admission:   Prior to Admission medications    Medication Sig Start Date End Date Taking? Authorizing Provider   citalopram (CELEXA) 20 MG tablet Take 20 mg by mouth daily   Yes Historical Provider, MD   rosuvastatin (CRESTOR) 10 MG tablet Take 10 mg by mouth daily   Yes Historical Provider, MD   Multiple Vitamins-Minerals (THERAPEUTIC MULTIVITAMIN-MINERALS) tablet Take 1 tablet by mouth daily   Yes Historical Provider, MD   esomeprazole Magnesium (NEXIUM) 20 MG PACK Take 20 mg by mouth daily   Yes Historical Provider, MD       Current medications:    Current Facility-Administered Medications   Medication Dose Route Frequency Provider Last Rate Last Admin    0.9 % sodium chloride infusion  25 mL IntraVENous PRN JULIAN Johnson        0.9 % sodium chloride infusion   IntraVENous Continuous Myra [place] 125 mL/hr at 22 0711 NoRateChange at 22 0711    ceFAZolin (ANCEF) 2000 mg in sterile water 20 mL IV syringe  2,000 mg IntraVENous See Admin Instructions JULIAN Johnson        sodium chloride flush 0.9 % injection 5-40 mL  5-40 mL IntraVENous 2 times per day JULIAN Johnson        sodium chloride flush 0.9 % injection 5-40 mL  5-40 mL IntraVENous PRN JULIAN Johnson           Allergies:  No Known Allergies    Problem List:  There is no problem list on file for this patient.       Past Medical History:        Diagnosis Date    [facility] ([facility] [address]     Cataract     Heartburn     Rheumatoid arthritis ([facility] [facility]     Skin cancer     Substance abuse ([facility] [address] [facility]     Tuberculosis        Past Surgical History:        Procedure Laterality Date    TONSILLECTOMY  1965       Social History:    Social History     Tobacco Use    Smoking status: Never Smoker    Smokeless tobacco: Never Used   Substance Use Topics    Alcohol use: Not Currently                                Counseling given: Not Answered      Vital Signs (Current):   Vitals:    01/18/22 1554 01/25/22 0555   BP:  (!) 147/77   Pulse:  67   Resp:  16   Temp:  36.8 °C (98.3 °F)   TempSrc:  Temporal   SpO2:  97%   Weight: 160 lb (72.6 kg) 160 lb (72.6 kg)   Height: 5' 7\" (1.702 m) 5' 7\" (1.702 m)                                              BP Readings from Last 3 Encounters:   01/25/22 (!) 147/77   01/13/22 104/62       NPO Status: Time of last liquid consumption: 0445                        Time of last solid consumption: 2230                        Date of last liquid consumption: 01/24/22                        Date of last solid food consumption: 01/24/22    BMI:   Wt Readings from Last 3 Encounters:   01/25/22 160 lb (72.6 kg)   01/13/22 166 lb 3.2 oz (75.4 kg)     Body mass index is 25.06 kg/m². CBC:   Lab Results   Component Value Date    WBC 5.9 02/15/2021    RBC 5.17 02/15/2021    HGB 15.0 02/15/2021    HCT 45.7 02/15/2021    MCV 88.4 02/15/2021    RDW 12.7 02/15/2021     02/15/2021       CMP:   Lab Results   Component Value Date     02/15/2021    K 4.8 02/15/2021     02/15/2021    CO2 30 02/15/2021    BUN 21 02/15/2021    CREATININE 0.9 02/15/2021    GFRAA >60 02/15/2021    LABGLOM >60 02/15/2021    GLUCOSE 93 02/15/2021    PROT 7.7 02/15/2021    CALCIUM 9.8 02/15/2021    BILITOT 0.6 02/15/2021    ALKPHOS 82 02/15/2021    AST 19 02/15/2021    ALT 13 02/15/2021       POC Tests: No results for input(s): POCGLU, POCNA, POCK, POCCL, POCBUN, POCHEMO, POCHCT in the last 72 hours.     Coags: No results found for: PROTIME, INR, APTT    HCG (If Applicable): No results found for: PREGTESTUR, PREGSERUM, HCG, HCGQUANT     ABGs: No results found for: PHART, PO2ART, MJT0RBV, YEV5JDT, BEART, L8ZVMCLF     Type & Screen (If Applicable):  No results found for: LABABO, LABRH    Drug/Infectious Status (If Applicable):  No results found for: HIV, HEPCAB    COVID-19 Screening (If Applicable): No results found for: COVID19        Anesthesia Evaluation  Patient summary reviewed  Airway: Mallampati: II  TM distance: >3 FB   Neck ROM: full  Mouth opening: > = 3 FB Dental: normal exam         Pulmonary:Negative Pulmonary ROS                              Cardiovascular:    (+) hyperlipidemia                  Neuro/Psych:   Negative Neuro/Psych ROS               ROS comment: H/O substance abuse 36 years ago GI/Hepatic/Renal:   (+) GERD: well controlled,           Endo/Other:    (+) : arthritis: rheumatoid. , malignancy/cancer (skin Daniel). Abdominal:             Vascular: negative vascular ROS. Other Findings:             Anesthesia Plan      MAC     ASA 2       Induction: intravenous. Anesthetic plan and risks discussed with patient. Plan discussed with attending.                   Tayla Lee   1/25/2022

## 2022-01-25 NOTE — ANESTHESIA POSTPROCEDURE EVALUATION
Department of Anesthesiology  Postprocedure Note    Patient: Kristi Austin  MRN: [id number]  YOB: 1950  Date of evaluation: 1/25/2022  Time:  8:56 AM     Procedure Summary     Date: 01/25/22 Room / Location: [facility] OR 10 / [facility]    Anesthesia Start: 9450 Anesthesia Stop: 2538    Procedure: EXCISION SQUAMOUS CELL CARCINOMA LEFT FOREARM FULL THICKNESS SKIN GRAFT FROM LEFT CHEST (Left ) Diagnosis: (SQUAMOUS CELL CARCINOMA LEFT FOREARM)    Surgeons: Kamilah Smith MD Responsible Provider: Jamila Davidson MD    Anesthesia Type: MAC ASA Status: 2          Anesthesia Type: MAC    Cathy Phase I: Cathy Score: 10    Cathy Phase II: Cathy Score: 10    Last vitals: Reviewed and per EMR flowsheets.        Anesthesia Post Evaluation    Patient location during evaluation: PACU  Patient participation: complete - patient participated  Level of consciousness: awake  Pain score: 0  Airway patency: patent  Nausea & Vomiting: no nausea  Complications: no  Cardiovascular status: hemodynamically stable  Respiratory status: acceptable  Hydration status: stable

## 2022-01-25 NOTE — PROGRESS NOTES
Patient states that he was exposed to someone with Covid-19 three weeks ago. He was tested for the virus and tested negative.

## 2022-01-25 NOTE — OP NOTE
[address]                  [address] [place],  [facility]                                OPERATIVE REPORT    PATIENT NAME: Andrew Muro                   :        1950  MED REC NO:   [id number]                            ROOM:  ACCOUNT NO:   [de-identified]                           ADMIT DATE: 2022  PROVIDER:     Sha Crandall DO    DATE OF PROCEDURE:  2022    PREOPERATIVE DIAGNOSIS:  Left arm squamous cell carcinoma. POSTOPERATIVE DIAGNOSIS:  Left arm squamous cell carcinoma. PROCEDURE PERFORMED:  Excision of left arm squamous cell carcinoma with  full-thickness skin graft, lesion 20 x 25 mm, margin 4 mm, defect 30 x  35 mm, full-thickness skin graft 30 x 35 mm. SURGEON:  Kayla Hayward MD    ASSISTANTS:  Sha Crandall DO, PGY-3; Daysi Gamboa PA-C, due to  lack of resident assistants. ESTIMATED BLOOD LOSS:  5 mL. FLUIDS:  350 mL of IV crystalloid. FINDINGS:  Lesion 20 x 25 mm, margin 4 mm, defect 30 x 35 mm,  full-thickness skin graft 30 x 35 mm. INDICATIONS FOR PROCEDURE:  This is a 70-year-old male with history of  growing lesion on his left arm. This was biopsied and positive for  squamous cell carcinoma. Risks, benefits, and alternatives were  discussed including but not limited to bleeding, infection, damage to  adjacent structures, need for further procedure, failure of  reconstructive efforts. The patient understood and agreed to proceed  with the procedure. DESCRIPTION OF PROCEDURE:  The patient was brought back to the operating  room and placed supine on the table. SCDs were placed and functioning. The patient was handed to Anesthesia for monitored anesthesia care  induction. The head of the bed was then turned 90 degrees clockwise  from the anesthesia unit. The patient was prepped and draped in a  sterile fashion. The left arm lesion was then marked with 4-mm margins.   The left arm lesion was excised using a 15-blade scalpel and handed off  the field for pathologic review. Hemostasis was achieved with monopolar  cautery. Due to the size of the defect, the wound was not able to be  closed primarily due to excessive tension. Due to excessive tension  that would be on the arm and the defect, decision was made to perform a  full-thickness skin graft. A full-thickness skin graft was then taken  from the left upper chest in measurements of 30 x 35 mm. The  full-thickness skin graft was then sutured into place with 4-0 Chromic  suture on the left arm with tacking sutures in simple interrupted fashion. The parameter  of the full-thickness skin graft was then sutured in a simple running  fashion with the 4-0 Chromic. A bolster dressing of cotton balls,  Xeroform, and silk suture was then applied over the full-thickness skin  graft of the left arm and covered with bacitracin. Attention was turned  to the left chest defect. Hemostasis was achieved with monopolar  cautery and circumferential undermining of the left chest wall defect  was then performed in order to provide tension-free closure. This  surgical defect was closed with 4-0 Monocryl in a simple interrupted  fashion in the deep dermal layer. The skin was then closed with 5-0  Monocryl in a running intracuticular fashion. Dermabond, Steri-Strips  and Tegaderm were then applied. The left arm was then wrapped with ABD  and Kerlix. The patient was handed back to Anesthesia for proper  awakening. The patient tolerated the procedure without complication. Dr. Hiram Oppenheim was present and scrubbed for the entire case.         Gómez Valdovinos DO    D: 01/25/2022 9:35:17       T: 01/25/2022 12:00:58     [id number]  Job#: [id number]     Doc#: [id number]    CC:

## 2022-01-25 NOTE — H&P
Department of Plastic Surgery - Adult  Attending Consult Note        CHIEF COMPLAINT:   Squamous Cell Cancer of left forearm     History Obtained From:  patient     HISTORY OF PRESENT ILLNESS:                 The patient is a 70 y.o. 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 The patient states that they first noticed the lesion in November. It has grown in size since they first noticed the lesion. The lesion has changed in color and has not had discharge or bleeding. The pt has had the lesion biopsied and excised previously. The patient states that his doctor is concerned for residual squamous cell carcinoma at the edges of the excision site. The patient states the lesion is not painfull.   The pt denies any associated symptoms.       Past Medical History:    Past Medical History        Past Medical History:   Diagnosis Date    Cancer ([facility]      Cataract      Heartburn      Rheumatoid arthritis ([facility]      Skin cancer      Substance abuse ([facility]      Tuberculosis           Past Surgical History:    Past Surgical History         Past Surgical History:   Procedure Laterality Date    TONSILLECTOMY   1965         Current Medications:       Current Facility-Administered Medications          Current Outpatient Medications   Medication Sig Dispense Refill    citalopram (CELEXA) 20 MG tablet Take 20 mg by mouth daily        rosuvastatin (CRESTOR) 10 MG tablet Take 10 mg by mouth daily        Multiple Vitamins-Minerals (THERAPEUTIC MULTIVITAMIN-MINERALS) tablet Take 1 tablet by mouth daily        esomeprazole Magnesium (NEXIUM) 20 MG PACK Take 20 mg by mouth daily          No current facility-administered medications for this visit.          Allergies:  Patient has no known allergies.     Social History:   Social History               Socioeconomic History    Marital status:        Spouse name: Not on file    Number of children: Not on file    Years of education: Not on file    Highest education level: Not on file   Occupational History    Not on file   Tobacco Use    Smoking status: Never Smoker    Smokeless tobacco: Never Used   Substance and Sexual Activity    Alcohol use: Not Currently    Drug use: Not Currently    Sexual activity: Not on file   Other Topics Concern    Not on file   Social History Narrative    Not on file      Social Determinants of Health          Financial Resource Strain:     Difficulty of Paying Living Expenses: Not on file   Food Insecurity:     Worried About Running Out of Food in the Last Year: Not on file    Kelechi of Food in the Last Year: Not on file   Transportation Needs:     Lack of Transportation (Medical): Not on file    Lack of Transportation (Non-Medical):  Not on file   Physical Activity:     Days of Exercise per Week: Not on file    Minutes of Exercise per Session: Not on file   Stress:     Feeling of Stress : Not on file   Social Connections:     Frequency of Communication with Friends and Family: Not on file    Frequency of Social Gatherings with Friends and Family: Not on file    Attends [employer] Services: Not on file    Active Member of [address] [place] [address] or Organizations: Not on file    Attends Club or Organization Meetings: Not on file    Marital Status: Not on file   Intimate Partner Violence:     Fear of Current or Ex-Partner: Not on file    Emotionally Abused: Not on file    Physically Abused: Not on file    Sexually Abused: Not on file   Housing Stability:     Unable to Pay for Housing in the Last Year: Not on file    Number of [place] in the Last Year: Not on file    Unstable Housing in the Last Year: Not on file         Family History:   Family History         Family History   Problem Relation Age of Onset    Rheum Arthritis Mother      Cancer Mother      Depression Mother      Migraines Mother      Stroke Father              REVIEW OF SYSTEMS:    CONSTITUTIONAL:  negative for  fevers, chills, sweats and fatigue  EYES: negative for dipolpia or acute vision loss. RESPIRATORY:  negative for  dry cough, cough with sputum, dyspnea, wheezing and chest pain  HENT:negative for pain, headache, difficulty swallowing or nose bleeds. CARDIOVASCULAR:  negative for  chest pain, dyspnea, palpitations, syncope  GASTROINTESTINAL:  negative for nausea, vomiting, change in bowel habits, diarrhea, constipation and abdominal pain  EXTREMITIES: negative for edema  MUSCULOSKELETAL: negative for muscle weakness  SKIN: positive for lesion, negative for itching or rashes. HEME: negative for easy brusing or bleeding  PSYCH: Alert and oriented to person place and time        PHYSICAL EXAM:       VITALS:  /62 (Site: Right Upper Arm, Position: Sitting, Cuff Size: Medium Adult)   Pulse 80   Temp 97 °F (36.1 °C) (Temporal)   Ht 5' 7.5\" (1.715 m)   Wt 166 lb 3.2 oz (75.4 kg)   SpO2 96%   BMI 25.65 kg/m²   CONSTITUTIONAL:  awake, alert, cooperative, no apparent distress, and appears stated age  EYES: PERRLA, EOMI, no signs of occular infection  LUNGS:  No increased work of breathing, good air exchange, clear to auscultation bilaterally, no crackles or wheezing  CARDIOVASCULAR:  Normal apical impulse, regular rate and rhythm  EXTREMITIES: no signs of clubbing or cyanosis. MUSCULOSKELETAL: negative for flaccid muscle tone or spastic movements. NEURO: Cranial nerves II-XII grossly intact. No signs of agitated mood.      SKIN: Biopsy proven squamous cell carcinoma left forearm-  25mm x 20mm,  Skin tone in color, irregular border, raised, no signs of bleeding,drainage or infection.  Non tender to palpation.      DATA:    Labs: CBC:         Lab Results   Component Value Date     WBC 5.9 02/15/2021     RBC 5.17 02/15/2021     HGB 15.0 02/15/2021     HCT 45.7 02/15/2021     MCV 88.4 02/15/2021     MCH 29.0 02/15/2021     MCHC 32.8 02/15/2021     RDW 12.7 02/15/2021      02/15/2021     MPV 9.9 02/15/2021      BMP: Lab Results   Component Value Date      02/15/2021     K 4.8 02/15/2021      02/15/2021     CO2 30 02/15/2021     BUN 21 02/15/2021     LABALBU 4.9 02/15/2021     CREATININE 0.9 02/15/2021     CALCIUM 9.8 02/15/2021     GFRAA >60 02/15/2021     LABGLOM >60 02/15/2021     GLUCOSE 93 02/15/2021         Radiology Review:  No radiology needed at this time  Pathology Review:  Pathology reviewed             IMPRESSION/RECOMMENDATIONS:          Diagnosis  -) Squamous cell carcinoma of left forearm        -The patient was counseled on their pathology results and the need for surgical intervention. Complex closure vs FTSG   -We will plan to proceed and have the lesion formely excised with margins in the operating room. -The patient will  require frozen sections in the operating room  -The patient will  require pre-op clearance from their PCP.          -The risks, benefits and options were discussed with the pt. The risks included but not limited to pain, bleeding, infection, heavy scarring, damage to surrounding structures, fluid collections, asymmetry, and need for further procedures. All of His questions were answered to their satisfaction and He agrees to proceed with the procedure. This document is generated, in part, by voice recognition software and thus  syntax and grammatical errors are possible.     Paulino Chen MD  7:22 AM  1/25/2022

## 2022-01-27 NOTE — PROGRESS NOTES
Subjective: Follow up today from   Excision of left arm squamous cell carcinoma with  full-thickness skin graft. Denies fever, nausea, vomiting, leg pain or swelling, pain is absent. The pt states that they have  kept the skin graft covered with bolster dressings sutured in place as well as Steri-Strips and Tegaderm to their donor site. The patient states that they have  finished their antibiotic. They state that their pain is absent. Objective: There were no vitals taken for this visit. Donor Wound: Clean, and intact. No signs of infection, wound healing well    Left arm Wound:  Bolster dressing intact , approximately 99% of the FTSG intact     Neuro- Sensation  intact to antonio wound sites with light touch     Assessment:    Patient Active Problem List   Diagnosis    Post-op pain       Labs: CBC:   Lab Results   Component Value Date    WBC 5.9 02/15/2021    RBC 5.17 02/15/2021    HGB 15.0 02/15/2021    HCT 45.7 02/15/2021    MCV 88.4 02/15/2021    MCH 29.0 02/15/2021    MCHC 32.8 02/15/2021    RDW 12.7 02/15/2021     02/15/2021    MPV 9.9 02/15/2021     BMP:    Lab Results   Component Value Date     02/15/2021    K 4.8 02/15/2021     02/15/2021    CO2 30 02/15/2021    BUN 21 02/15/2021    LABALBU 4.9 02/15/2021    CREATININE 0.9 02/15/2021    CALCIUM 9.8 02/15/2021    GFRAA >60 02/15/2021    LABGLOM >60 02/15/2021    GLUCOSE 93 02/15/2021         Pathology Report- [address]           [address]   [address]     [address]            [address]                                                   [address]   [place], [address], [address] [place] [address]               FINAL SURGICAL PATHOLOGY REPORT     NAME:           Dusty Will         Date of       01/25/2022                                            Collection:   Medical Record   [id number]              Date of       01/26/2022   Number:                                  Receipt:   Age:  76 Y        Sex:  M                Date          01/28/2022 14:45                                            Reported:   Date Of Birth:   1950   Financial        [id number]             Admitting     BOONE GARCIA   Number:                                  Physician:   Patient          ALBERTA ESCALANTE         Ordering      BOONE GARCIA   Location:                                Physician:     Accession Number:  HES-                                             Additional Physicians:MIKE TOLEDO       Diagnosis:   Left forearm, excision: Invasive, well-differentiated squamous cell   carcinoma (grade 1)   Surgical margins negative for malignancy   Ruptured folliculitis with foreign body giant cell reaction and dermal   scar                                              William Baum M.D.   [facility]                                   (Electronic Signature)   Plan:     Educated the patient on how to care for their skin graft. Showed the patient that they will need to apply bacitracin overlaying the skin graft and xeroform gauze is to be placed over the bacitracin. This can then be covered with gauze. The patient is to have this dressing changed daily. The patient is able to leave their donor site wound open to air has is healing well. The patient voices understanding with instructions. Dressing supplies given today. F/U 2 weeks  Call office with concerns or signs of infection.     Harley Khan   9:05 AM  1/27/2022

## 2022-01-31 ENCOUNTER — OFFICE VISIT (OUTPATIENT)
Dept: SURGERY | Age: 72
End: 2022-01-31

## 2022-01-31 VITALS — TEMPERATURE: 97.1 F

## 2022-01-31 DIAGNOSIS — C44.629 SQUAMOUS CELL CARCINOMA OF LEFT UPPER EXTREMITY: Primary | ICD-10-CM

## 2022-01-31 PROCEDURE — 99024 POSTOP FOLLOW-UP VISIT: CPT | Performed by: PHYSICIAN ASSISTANT

## 2022-02-09 NOTE — PROGRESS NOTES
Subjective: Follow up today from   Excision of left arm squamous cell carcinoma with  full-thickness skin graft. Denies fever, nausea, vomiting, leg pain or swelling, pain is absent. He has kept the full-thickness skin graft covered with bacitracin and a gauze pad as previously directed. Objective: There were no vitals taken for this visit. Donor Wound: Clean, and intact.   No signs of infection, wound healing well    Left arm Wound: approximately 99% of the FTSG intact     Neuro- Sensation  intact to antonio wound sites with light touch     Assessment:    Patient Active Problem List   Diagnosis    Post-op pain       Labs: CBC:   Lab Results   Component Value Date    WBC 5.9 02/15/2021    RBC 5.17 02/15/2021    HGB 15.0 02/15/2021    HCT 45.7 02/15/2021    MCV 88.4 02/15/2021    MCH 29.0 02/15/2021    MCHC 32.8 02/15/2021    RDW 12.7 02/15/2021     02/15/2021    MPV 9.9 02/15/2021     BMP:    Lab Results   Component Value Date     02/15/2021    K 4.8 02/15/2021     02/15/2021    CO2 30 02/15/2021    BUN 21 02/15/2021    LABALBU 4.9 02/15/2021    CREATININE 0.9 02/15/2021    CALCIUM 9.8 02/15/2021    GFRAA >60 02/15/2021    LABGLOM >60 02/15/2021    GLUCOSE 93 02/15/2021         Pathology Report- [address]        [address] [place] [address] [address]     [address]            [address]                                                   [address] [facility]   [place], [address], [address] [place] [address]               FINAL SURGICAL PATHOLOGY REPORT     NAME:           Ludy Holbrook         Date of       01/25/2022                                            Collection:   Medical Record   [id number]              Date of       01/26/2022   Number:                                  Receipt:   Age:  76 Y        Sex:  M                Date          01/28/2022 14:45                                            Reported:   Date Of Birth:   1950   Financial        [id number]             Admitting     BOONE GARCIA   Number:                                  Physician:   Patient          ALBERTA ESCALANTE         Ordering      BOONE GARCIA   Location:                                Physician:     Accession Number:  HES-                                             Additional Physicians:MIKE TOLEDO       Diagnosis:   Left forearm, excision: Invasive, well-differentiated squamous cell   carcinoma (grade 1)   Surgical margins negative for malignancy   Ruptured folliculitis with foreign body giant cell reaction and dermal   scar                                              Maureen Herbert M.D.                                        (Electronic Signature)   Plan:     Educated the patient on the pathology report. Okay to discontinue bacitracin at this time okay to leave full-thickness skin graft open air. Okay to begin OTC lotion full-thickness skin graft. Educated the patient to leave his donor site of the supraclavicular scar line open to air. The patient voices understanding with instructions. Dressing supplies given today. Scar Care Instructions      Sunscreen Recommendations for Scars   We recommend that all patients use a sunscreen when outside but especially on new scars (that are exposed to sunlight) for a year after their procedure.  The SPF should be at least 28. Follow the application directions on the bottle. Why is it so Important to Use Sunscreen on Scars?  A scar is new and more fragile than the surrounding skin.  If you do not use sunscreen, the scar line will react differently to the sun than the surrounding skin.  If you don't use sunscreen, the scar tissue will become darker than surrounding skin.  This is a hyper-pigmented scar and will remain darker than the other skin.  After one year, the scar and surrounding skin should react equally to sun. Superficial Scar Massage   Scar massage desensitizes and reduces scar adhesions so skin glides freely.  Rub in a circular motion on and around the scar with firm, even pressure for 5 minutes four times per day    You can start scar massage once incision is completely healed and strong enough to handle the motion (usually 10 - 14 days post operatively).  You use lotion to do the scar massage to allow ease with motion over the scar and prevent friction at the area. Patient had no further Questions. Paper instructions given to patient. F/U PRN  Call office with concerns or signs of infection.     JULIAN Khan

## 2022-02-14 ENCOUNTER — OFFICE VISIT (OUTPATIENT)
Dept: SURGERY | Age: 72
End: 2022-02-14

## 2022-02-14 VITALS — TEMPERATURE: 96.4 F

## 2022-02-14 DIAGNOSIS — C44.629 SQUAMOUS CELL CARCINOMA OF LEFT UPPER EXTREMITY: Primary | ICD-10-CM

## 2022-02-14 PROCEDURE — 99024 POSTOP FOLLOW-UP VISIT: CPT | Performed by: PHYSICIAN ASSISTANT

## 2022-02-15 ENCOUNTER — HOSPITAL ENCOUNTER (OUTPATIENT)
Age: 72
Discharge: HOME | End: 2022-02-15
Payer: COMMERCIAL

## 2022-02-15 LAB
ALBUMIN SERPL-MCNC: 4.7 G/DL (ref 3.5–5.2)
ALP BLD-CCNC: 85 U/L (ref 40–129)
ALT SERPL-CCNC: 16 U/L (ref 0–40)
ANION GAP SERPL CALCULATED.3IONS-SCNC: 8 MMOL/L (ref 7–16)
AST SERPL-CCNC: 21 U/L (ref 0–39)
BASOPHILS ABSOLUTE: 0.02 E9/L (ref 0–0.2)
BASOPHILS RELATIVE PERCENT: 0.3 % (ref 0–2)
BILIRUB SERPL-MCNC: 0.4 MG/DL (ref 0–1.2)
BUN BLDV-MCNC: 17 MG/DL (ref 6–23)
CALCIUM SERPL-MCNC: 9.7 MG/DL (ref 8.6–10.2)
CHLORIDE BLD-SCNC: 102 MMOL/L (ref 98–107)
CHOLESTEROL, TOTAL: 148 MG/DL (ref 0–199)
CO2: 29 MMOL/L (ref 22–29)
CREAT SERPL-MCNC: 0.8 MG/DL (ref 0.7–1.2)
EOSINOPHILS ABSOLUTE: 0.21 E9/L (ref 0.05–0.5)
EOSINOPHILS RELATIVE PERCENT: 2.9 % (ref 0–6)
GFR AFRICAN AMERICAN: >60
GFR NON-AFRICAN AMERICAN: >60 ML/MIN/1.73
GLUCOSE BLD-MCNC: 87 MG/DL (ref 74–99)
HCT VFR BLD CALC: 43.5 % (ref 37–54)
HDLC SERPL-MCNC: 52 MG/DL
HEMOGLOBIN: 14.1 G/DL (ref 12.5–16.5)
IMMATURE GRANULOCYTES #: 0.03 E9/L
IMMATURE GRANULOCYTES %: 0.4 % (ref 0–5)
LDL CHOLESTEROL CALCULATED: 49 MG/DL (ref 0–99)
LYMPHOCYTES ABSOLUTE: 1.82 E9/L (ref 1.5–4)
LYMPHOCYTES RELATIVE PERCENT: 25.2 % (ref 20–42)
MCH RBC QN AUTO: 28.9 PG (ref 26–35)
MCHC RBC AUTO-ENTMCNC: 32.4 % (ref 32–34.5)
MCV RBC AUTO: 89.1 FL (ref 80–99.9)
MONOCYTES ABSOLUTE: 0.5 E9/L (ref 0.1–0.95)
MONOCYTES RELATIVE PERCENT: 6.9 % (ref 2–12)
NEUTROPHILS ABSOLUTE: 4.63 E9/L (ref 1.8–7.3)
NEUTROPHILS RELATIVE PERCENT: 64.3 % (ref 43–80)
PDW BLD-RTO: 12.6 FL (ref 11.5–15)
PLATELET # BLD: 311 E9/L (ref 130–450)
PMV BLD AUTO: 10.1 FL (ref 7–12)
POTASSIUM SERPL-SCNC: 3.8 MMOL/L (ref 3.5–5)
PROSTATE SPECIFIC ANTIGEN: 4.18 NG/ML (ref 0–4)
RBC # BLD: 4.88 E12/L (ref 3.8–5.8)
SODIUM BLD-SCNC: 139 MMOL/L (ref 132–146)
T4 TOTAL: 5.4 MCG/DL (ref 4.5–11.7)
TOTAL PROTEIN: 7.4 G/DL (ref 6.4–8.3)
TRIGL SERPL-MCNC: 235 MG/DL (ref 0–149)
TSH SERPL DL<=0.05 MIU/L-ACNC: 1.41 UIU/ML (ref 0.27–4.2)
VLDLC SERPL CALC-MCNC: 47 MG/DL
WBC # BLD: 7.2 E9/L (ref 4.5–11.5)

## 2022-02-15 PROCEDURE — 36415 COLL VENOUS BLD VENIPUNCTURE: CPT

## 2022-02-15 PROCEDURE — 80053 COMPREHEN METABOLIC PANEL: CPT

## 2022-02-15 PROCEDURE — 84443 ASSAY THYROID STIM HORMONE: CPT

## 2022-02-15 PROCEDURE — G0103 PSA SCREENING: HCPCS

## 2022-02-15 PROCEDURE — 85025 COMPLETE CBC W/AUTO DIFF WBC: CPT

## 2022-02-15 PROCEDURE — 84436 ASSAY OF TOTAL THYROXINE: CPT

## 2022-02-15 PROCEDURE — 80061 LIPID PANEL: CPT

## 2022-06-25 ENCOUNTER — HOSPITAL ENCOUNTER (OUTPATIENT)
Age: 72
Discharge: HOME | End: 2022-06-25
Payer: MEDICARE

## 2022-06-25 LAB
ALBUMIN SERPL-MCNC: 4.6 G/DL (ref 3.5–5.2)
ALP BLD-CCNC: 74 U/L (ref 40–129)
ALT SERPL-CCNC: 13 U/L (ref 0–40)
ANION GAP SERPL CALCULATED.3IONS-SCNC: 8 MMOL/L (ref 7–16)
AST SERPL-CCNC: 19 U/L (ref 0–39)
BASOPHILS ABSOLUTE: 0.02 E9/L (ref 0–0.2)
BASOPHILS RELATIVE PERCENT: 0.3 % (ref 0–2)
BILIRUB SERPL-MCNC: 0.4 MG/DL (ref 0–1.2)
BUN BLDV-MCNC: 15 MG/DL (ref 6–23)
CALCIUM SERPL-MCNC: 9.2 MG/DL (ref 8.6–10.2)
CHLORIDE BLD-SCNC: 104 MMOL/L (ref 98–107)
CHOLESTEROL, TOTAL: 128 MG/DL (ref 0–199)
CO2: 29 MMOL/L (ref 22–29)
CREAT SERPL-MCNC: 0.8 MG/DL (ref 0.7–1.2)
EOSINOPHILS ABSOLUTE: 0.09 E9/L (ref 0.05–0.5)
EOSINOPHILS RELATIVE PERCENT: 1.5 % (ref 0–6)
GFR AFRICAN AMERICAN: >60
GFR NON-AFRICAN AMERICAN: >60 ML/MIN/1.73
GLUCOSE BLD-MCNC: 92 MG/DL (ref 74–99)
HCT VFR BLD CALC: 44.3 % (ref 37–54)
HDLC SERPL-MCNC: 59 MG/DL
HEMOGLOBIN: 14.1 G/DL (ref 12.5–16.5)
IMMATURE GRANULOCYTES #: 0.01 E9/L
IMMATURE GRANULOCYTES %: 0.2 % (ref 0–5)
LDL CHOLESTEROL CALCULATED: 54 MG/DL (ref 0–99)
LYMPHOCYTES ABSOLUTE: 1.99 E9/L (ref 1.5–4)
LYMPHOCYTES RELATIVE PERCENT: 32.9 % (ref 20–42)
MCH RBC QN AUTO: 27.8 PG (ref 26–35)
MCHC RBC AUTO-ENTMCNC: 31.8 % (ref 32–34.5)
MCV RBC AUTO: 87.4 FL (ref 80–99.9)
MONOCYTES ABSOLUTE: 0.51 E9/L (ref 0.1–0.95)
MONOCYTES RELATIVE PERCENT: 8.4 % (ref 2–12)
NEUTROPHILS ABSOLUTE: 3.43 E9/L (ref 1.8–7.3)
NEUTROPHILS RELATIVE PERCENT: 56.7 % (ref 43–80)
PDW BLD-RTO: 13.3 FL (ref 11.5–15)
PLATELET # BLD: 285 E9/L (ref 130–450)
PMV BLD AUTO: 9.8 FL (ref 7–12)
POTASSIUM SERPL-SCNC: 4.5 MMOL/L (ref 3.5–5)
RBC # BLD: 5.07 E12/L (ref 3.8–5.8)
SODIUM BLD-SCNC: 141 MMOL/L (ref 132–146)
T4 TOTAL: 6 MCG/DL (ref 4.5–11.7)
TOTAL PROTEIN: 7 G/DL (ref 6.4–8.3)
TRIGL SERPL-MCNC: 75 MG/DL (ref 0–149)
TSH SERPL DL<=0.05 MIU/L-ACNC: 2.04 UIU/ML (ref 0.27–4.2)
VLDLC SERPL CALC-MCNC: 15 MG/DL
WBC # BLD: 6.1 E9/L (ref 4.5–11.5)

## 2022-06-25 PROCEDURE — 84443 ASSAY THYROID STIM HORMONE: CPT

## 2022-06-25 PROCEDURE — 84436 ASSAY OF TOTAL THYROXINE: CPT

## 2022-06-25 PROCEDURE — 36415 COLL VENOUS BLD VENIPUNCTURE: CPT

## 2022-06-25 PROCEDURE — 85025 COMPLETE CBC W/AUTO DIFF WBC: CPT

## 2022-06-25 PROCEDURE — 80061 LIPID PANEL: CPT

## 2022-06-25 PROCEDURE — 80053 COMPREHEN METABOLIC PANEL: CPT

## 2023-05-09 ENCOUNTER — HOSPITAL ENCOUNTER (OUTPATIENT)
Age: 73
Discharge: HOME | End: 2023-05-09
Payer: MEDICARE

## 2023-05-09 LAB
ALBUMIN SERPL-MCNC: 4.6 G/DL (ref 3.5–5.2)
ALP SERPL-CCNC: 71 U/L (ref 40–129)
ALT SERPL-CCNC: 12 U/L (ref 0–40)
ANION GAP SERPL CALCULATED.3IONS-SCNC: 10 MMOL/L (ref 7–16)
AST SERPL-CCNC: 19 U/L (ref 0–39)
BASOPHILS # BLD: 0.02 E9/L (ref 0–0.2)
BASOPHILS NFR BLD: 0.3 % (ref 0–2)
BILIRUB SERPL-MCNC: 0.5 MG/DL (ref 0–1.2)
BUN SERPL-MCNC: 13 MG/DL (ref 6–23)
CALCIUM SERPL-MCNC: 9.6 MG/DL (ref 8.6–10.2)
CHLORIDE SERPL-SCNC: 106 MMOL/L (ref 98–107)
CHOLESTEROL, TOTAL: 153 MG/DL (ref 0–199)
CO2 SERPL-SCNC: 26 MMOL/L (ref 22–29)
CREAT SERPL-MCNC: 1 MG/DL (ref 0.7–1.2)
EOSINOPHIL # BLD: 0.09 E9/L (ref 0.05–0.5)
EOSINOPHIL NFR BLD: 1.2 % (ref 0–6)
ERYTHROCYTE [DISTWIDTH] IN BLOOD BY AUTOMATED COUNT: 13 FL (ref 11.5–15)
GLUCOSE SERPL-MCNC: 91 MG/DL (ref 74–99)
HCT VFR BLD AUTO: 44.4 % (ref 37–54)
HDLC SERPL-MCNC: 51 MG/DL
HGB BLD-MCNC: 14 G/DL (ref 12.5–16.5)
IMM GRANULOCYTES # BLD: 0.02 E9/L
IMM GRANULOCYTES NFR BLD: 0.3 % (ref 0–5)
LDLC SERPL CALC-MCNC: 70 MG/DL (ref 0–99)
LYMPHOCYTES # BLD: 2.06 E9/L (ref 1.5–4)
LYMPHOCYTES NFR BLD: 27.8 % (ref 20–42)
MCH RBC QN AUTO: 27.7 PG (ref 26–35)
MCHC RBC AUTO-ENTMCNC: 31.5 % (ref 32–34.5)
MCV RBC AUTO: 87.7 FL (ref 80–99.9)
MONOCYTES # BLD: 0.59 E9/L (ref 0.1–0.95)
MONOCYTES NFR BLD: 8 % (ref 2–12)
NEUTROPHILS # BLD: 4.64 E9/L (ref 1.8–7.3)
NEUTS SEG NFR BLD: 62.4 % (ref 43–80)
PLATELET # BLD AUTO: 317 E9/L (ref 130–450)
PMV BLD AUTO: 9.6 FL (ref 7–12)
POTASSIUM SERPL-SCNC: 4 MMOL/L (ref 3.5–5)
PROT SERPL-MCNC: 7.2 G/DL (ref 6.4–8.3)
PSA SERPL-MCNC: 5.12 NG/ML (ref 0–4)
RBC # BLD AUTO: 5.06 E12/L (ref 3.8–5.8)
SODIUM SERPL-SCNC: 142 MMOL/L (ref 132–146)
T4 SERPL-MCNC: 6.6 MCG/DL (ref 4.5–11.7)
TRIGL SERPL-MCNC: 162 MG/DL (ref 0–149)
TSH SERPL-MCNC: 1.07 UIU/ML (ref 0.27–4.2)
VLDLC SERPL CALC-MCNC: 32 MG/DL
WBC # BLD: 7.4 E9/L (ref 4.5–11.5)

## 2023-05-09 PROCEDURE — 85025 COMPLETE CBC W/AUTO DIFF WBC: CPT

## 2023-05-09 PROCEDURE — 36415 COLL VENOUS BLD VENIPUNCTURE: CPT

## 2023-05-09 PROCEDURE — 84443 ASSAY THYROID STIM HORMONE: CPT

## 2023-05-09 PROCEDURE — G0103 PSA SCREENING: HCPCS

## 2023-05-09 PROCEDURE — 80061 LIPID PANEL: CPT

## 2023-05-09 PROCEDURE — 80053 COMPREHEN METABOLIC PANEL: CPT

## 2023-05-09 PROCEDURE — 84436 ASSAY OF TOTAL THYROXINE: CPT

## 2023-07-08 ENCOUNTER — HOSPITAL ENCOUNTER (OUTPATIENT)
Age: 73
Discharge: HOME | End: 2023-07-08
Payer: MEDICARE

## 2023-07-08 LAB — PSA SERPL-MCNC: 5.22 NG/ML (ref 0–4)

## 2023-07-08 PROCEDURE — G0103 PSA SCREENING: HCPCS

## 2023-07-08 PROCEDURE — 36415 COLL VENOUS BLD VENIPUNCTURE: CPT

## 2023-07-28 ENCOUNTER — HOSPITAL ENCOUNTER (OUTPATIENT)
Age: 73
Discharge: HOME | End: 2023-07-28
Payer: MEDICARE

## 2023-07-28 LAB — PSA SERPL-MCNC: 4.98 NG/ML (ref 0–4)

## 2023-07-28 PROCEDURE — 36415 COLL VENOUS BLD VENIPUNCTURE: CPT

## 2023-07-28 PROCEDURE — 84153 ASSAY OF PSA TOTAL: CPT

## 2023-11-02 ENCOUNTER — HOSPITAL ENCOUNTER (OUTPATIENT)
Age: 73
Discharge: HOME | End: 2023-11-02
Payer: MEDICARE

## 2023-11-02 LAB
BILIRUB UR QL STRIP: NEGATIVE
CLARITY UR: CLEAR
COLOR UR: YELLOW
COMMENT: NORMAL
GLUCOSE UR STRIP-MCNC: NEGATIVE MG/DL
HGB UR QL STRIP.AUTO: NEGATIVE
KETONES UR STRIP-MCNC: NEGATIVE MG/DL
LEUKOCYTE ESTERASE UR QL STRIP: NEGATIVE
NITRITE UR QL STRIP: NEGATIVE
PH UR STRIP: 5.5 [PH] (ref 5–9)
PROT UR STRIP-MCNC: NEGATIVE MG/DL
SP GR UR STRIP: 1.01 (ref 1–1.03)
UROBILINOGEN UR STRIP-ACNC: 0.2 EU/DL (ref 0–1)

## 2023-11-02 PROCEDURE — 81003 URINALYSIS AUTO W/O SCOPE: CPT

## 2023-11-02 PROCEDURE — 87086 URINE CULTURE/COLONY COUNT: CPT

## 2023-11-03 LAB
MICROORGANISM SPEC CULT: NO GROWTH
SPECIMEN DESCRIPTION: NORMAL

## 2023-11-06 ENCOUNTER — HOSPITAL ENCOUNTER (OUTPATIENT)
Age: 73
Discharge: HOME | End: 2023-11-08

## 2023-11-06 PROCEDURE — 88305 TISSUE EXAM BY PATHOLOGIST: CPT

## 2023-11-08 LAB — SURGICAL PATHOLOGY REPORT: NORMAL

## 2024-01-31 ENCOUNTER — HOSPITAL ENCOUNTER (OUTPATIENT)
Age: 74
Discharge: HOME | End: 2024-01-31
Payer: MEDICARE

## 2024-01-31 LAB — PSA SERPL-MCNC: 7.39 NG/ML (ref 0–4)

## 2024-01-31 PROCEDURE — 36415 COLL VENOUS BLD VENIPUNCTURE: CPT

## 2024-01-31 PROCEDURE — 84153 ASSAY OF PSA TOTAL: CPT

## 2024-03-28 ENCOUNTER — HOSPITAL ENCOUNTER (OUTPATIENT)
Age: 74
Discharge: HOME | End: 2024-03-28
Payer: MEDICARE

## 2024-03-28 LAB — PSA SERPL-MCNC: 5.89 NG/ML (ref 0–4)

## 2024-03-28 PROCEDURE — 84153 ASSAY OF PSA TOTAL: CPT

## 2024-03-28 PROCEDURE — 36415 COLL VENOUS BLD VENIPUNCTURE: CPT

## 2024-12-06 ENCOUNTER — HOSPITAL ENCOUNTER (OUTPATIENT)
Age: 74
Discharge: HOME | End: 2024-12-06
Payer: MEDICARE

## 2024-12-06 LAB — PSA SERPL-MCNC: 7.53 NG/ML (ref 0–4)

## 2024-12-06 PROCEDURE — 36415 COLL VENOUS BLD VENIPUNCTURE: CPT

## 2024-12-06 PROCEDURE — 84153 ASSAY OF PSA TOTAL: CPT

## 2025-02-07 ENCOUNTER — HOSPITAL ENCOUNTER (OUTPATIENT)
Age: 75
Discharge: HOME | End: 2025-02-07
Payer: MEDICARE

## 2025-02-07 LAB
BILIRUB UR QL STRIP: NEGATIVE
CLARITY UR: CLEAR
COLOR UR: YELLOW
COMMENT: NORMAL
GLUCOSE UR STRIP-MCNC: NEGATIVE MG/DL
HGB UR QL STRIP.AUTO: NEGATIVE
KETONES UR STRIP-MCNC: NEGATIVE MG/DL
LEUKOCYTE ESTERASE UR QL STRIP: NEGATIVE
NITRITE UR QL STRIP: NEGATIVE
PH UR STRIP: 6.5 [PH] (ref 5–8)
PROT UR STRIP-MCNC: NEGATIVE MG/DL
SP GR UR STRIP: 1.02 (ref 1–1.03)
UROBILINOGEN UR STRIP-ACNC: 0.2 EU/DL (ref 0–1)

## 2025-02-07 PROCEDURE — 81003 URINALYSIS AUTO W/O SCOPE: CPT

## 2025-02-07 PROCEDURE — 87086 URINE CULTURE/COLONY COUNT: CPT

## 2025-02-08 LAB
MICROORGANISM SPEC CULT: NO GROWTH
SPECIMEN DESCRIPTION: NORMAL

## 2025-02-11 ENCOUNTER — HOSPITAL ENCOUNTER (OUTPATIENT)
Age: 75
Discharge: HOME | End: 2025-02-13

## 2025-02-11 PROCEDURE — 88305 TISSUE EXAM BY PATHOLOGIST: CPT

## 2025-02-11 PROCEDURE — 88342 IMHCHEM/IMCYTCHM 1ST ANTB: CPT

## 2025-02-11 PROCEDURE — 88341 IMHCHEM/IMCYTCHM EA ADD ANTB: CPT

## 2025-02-14 LAB — SURGICAL PATHOLOGY REPORT: NORMAL

## 2025-07-02 ENCOUNTER — HOSPITAL ENCOUNTER (OUTPATIENT)
Age: 75
Discharge: HOME | End: 2025-07-02
Payer: MEDICARE

## 2025-07-02 PROCEDURE — 84153 ASSAY OF PSA TOTAL: CPT

## 2025-07-02 PROCEDURE — 36415 COLL VENOUS BLD VENIPUNCTURE: CPT

## 2025-07-02 PROCEDURE — 84154 ASSAY OF PSA FREE: CPT

## 2025-07-05 LAB
PROSTATE SPECIFIC ANTIGEN FREE: 1.3 NG/ML
PROSTATE SPECIFIC ANTIGEN PERCENT FREE: 18 %
PROSTATE SPECIFIC ANTIGEN: 7.3 NG/ML (ref 0–4)

## (undated) DEVICE — DRESSING,GAUZE,XEROFORM,CURAD,5"X9",ST: Brand: CURAD

## (undated) DEVICE — COTTON STRIP: Brand: DEROYAL

## (undated) DEVICE — 3M™ TEGADERM™ TRANSPARENT FILM DRESSING FRAME STYLE, 1626W, 4 IN X 4-3/4 IN (10 CM X 12 CM), 50/CT 4CT/CASE: Brand: 3M™ TEGADERM™

## (undated) DEVICE — SURGICAL PROCEDURE PACK BASIC

## (undated) DEVICE — TUBING SUCT 12FR MAL ALUM SHFT FN CAP VENT UNIV CONN W/ OBT

## (undated) DEVICE — STERILE POLYISOPRENE POWDER-FREE SURGICAL GLOVES: Brand: PROTEXIS

## (undated) DEVICE — ADHESIVE SKIN CLSR 0.7ML TOP DERMBND ADV

## (undated) DEVICE — APPLICATOR MEDICATED 26 CC SOLUTION HI LT ORNG CHLORAPREP

## (undated) DEVICE — DRAPE,HAND,STERILE: Brand: MEDLINE

## (undated) DEVICE — TOWEL,OR,DSP,ST,BLUE,STD,6/PK,12PK/CS: Brand: MEDLINE

## (undated) DEVICE — SYRINGE MED 10ML TRNSLUC BRL PLUNG BLK MRK POLYPR CTRL

## (undated) DEVICE — ELECTRODE PT RET AD L9FT HI MOIST COND ADH HYDRGEL CORDED

## (undated) DEVICE — NEEDLE HYPO 27GA L1.25IN GRY POLYPR HUB S STL REG BVL STR

## (undated) DEVICE — SET INSTRUMENT MINOR PLASTICS

## (undated) DEVICE — CLOTH SURG PREP PREOPERATIVE CHLORHEXIDINE GLUC 2% READYPREP

## (undated) DEVICE — INTENDED FOR TISSUE SEPARATION, AND OTHER PROCEDURES THAT REQUIRE A SHARP SURGICAL BLADE TO PUNCTURE OR CUT.: Brand: BARD-PARKER ® STAINLESS STEEL BLADES

## (undated) DEVICE — Z CONVERTED USE 2276060 DRESSING NONADHESIVE W3XL4IN WHT COT OUCHLSS RECT BONDED